# Patient Record
Sex: FEMALE
[De-identification: names, ages, dates, MRNs, and addresses within clinical notes are randomized per-mention and may not be internally consistent; named-entity substitution may affect disease eponyms.]

---

## 2019-01-18 ENCOUNTER — RESULT REVIEW (OUTPATIENT)
Age: 34
End: 2019-01-18

## 2019-11-27 ENCOUNTER — APPOINTMENT (OUTPATIENT)
Dept: OTOLARYNGOLOGY | Facility: CLINIC | Age: 34
End: 2019-11-27
Payer: COMMERCIAL

## 2019-11-27 ENCOUNTER — TRANSCRIPTION ENCOUNTER (OUTPATIENT)
Age: 34
End: 2019-11-27

## 2019-11-27 VITALS
HEART RATE: 56 BPM | SYSTOLIC BLOOD PRESSURE: 111 MMHG | BODY MASS INDEX: 22.22 KG/M2 | HEIGHT: 69 IN | WEIGHT: 150 LBS | DIASTOLIC BLOOD PRESSURE: 60 MMHG

## 2019-11-27 DIAGNOSIS — Z78.9 OTHER SPECIFIED HEALTH STATUS: ICD-10-CM

## 2019-11-27 DIAGNOSIS — Z85.828 PERSONAL HISTORY OF OTHER MALIGNANT NEOPLASM OF SKIN: ICD-10-CM

## 2019-11-27 DIAGNOSIS — Z80.3 FAMILY HISTORY OF MALIGNANT NEOPLASM OF BREAST: ICD-10-CM

## 2019-11-27 DIAGNOSIS — Z87.09 PERSONAL HISTORY OF OTHER DISEASES OF THE RESPIRATORY SYSTEM: ICD-10-CM

## 2019-11-27 PROBLEM — Z00.00 ENCOUNTER FOR PREVENTIVE HEALTH EXAMINATION: Status: ACTIVE | Noted: 2019-11-27

## 2019-11-27 PROCEDURE — 31231 NASAL ENDOSCOPY DX: CPT

## 2019-11-27 PROCEDURE — 99203 OFFICE O/P NEW LOW 30 MIN: CPT | Mod: 25

## 2019-11-27 NOTE — ASSESSMENT
[FreeTextEntry1] : She has a 10 month history of sinus and throat symptoms. A culture was sent to persistent bacterial sinus infection. She has mild allergies. She also suffers from reflux.\par \par PLAN\par \par -findings and management options discussed in detail with the patient. \par -Nasal saline rinses, nasal steroid spray (such as fluticasone), antihistamine/decongestant as needed\par -they were asked to call for the results of the culture in the next few days. I will place her on antibiotics if the culture is positive\par -Allergy precautions\par -CT scan of the sinuses\par -Reflux precautions and trial of Pepcid\par -follow up after the CT scan\par -call and return earlier if any concerns. \par

## 2019-11-27 NOTE — CONSULT LETTER
[Dear  ___] : Dear  [unfilled], [Consult Letter:] : I had the pleasure of evaluating your patient, [unfilled]. [Please see my note below.] : Please see my note below. [Consult Closing:] : Thank you very much for allowing me to participate in the care of this patient.  If you have any questions, please do not hesitate to contact me. [Sincerely,] : Sincerely, [FreeTextEntry3] : Natacha Leon MD\par

## 2019-11-27 NOTE — HISTORY OF PRESENT ILLNESS
[de-identified] : ASHLEY DICK is a 34 year patient with a 10 month history of sinus and throat symptoms. She initially had a URI.  she had persistent symptoms. She had allergy testing in the spring which was positive for grass allergy.  She was treated with flonase, zyrtec and augmentin (over the summer).  She had brief improvement in her symptoms. She has postnasal drip, throat clearing, globus sensation, heartburn, nasal congestion and mild intermittent obstruction. She does not have facial pain.  She has reflux. She takes TUMS as needed. She does notice that certain foods make her symptoms worse.  She does not smoke or have pets.

## 2019-12-03 LAB — BACTERIA FLD CULT: NORMAL

## 2020-01-03 ENCOUNTER — FORM ENCOUNTER (OUTPATIENT)
Age: 35
End: 2020-01-03

## 2020-01-04 ENCOUNTER — OUTPATIENT (OUTPATIENT)
Dept: OUTPATIENT SERVICES | Facility: HOSPITAL | Age: 35
LOS: 1 days | End: 2020-01-04

## 2020-01-04 ENCOUNTER — APPOINTMENT (OUTPATIENT)
Dept: CT IMAGING | Facility: CLINIC | Age: 35
End: 2020-01-04
Payer: COMMERCIAL

## 2020-01-04 PROCEDURE — 70486 CT MAXILLOFACIAL W/O DYE: CPT | Mod: 26

## 2020-01-22 ENCOUNTER — APPOINTMENT (OUTPATIENT)
Dept: OTOLARYNGOLOGY | Facility: CLINIC | Age: 35
End: 2020-01-22
Payer: COMMERCIAL

## 2020-01-22 VITALS — SYSTOLIC BLOOD PRESSURE: 119 MMHG | DIASTOLIC BLOOD PRESSURE: 66 MMHG | HEART RATE: 55 BPM

## 2020-01-22 DIAGNOSIS — J32.8 OTHER CHRONIC SINUSITIS: ICD-10-CM

## 2020-01-22 PROCEDURE — 99213 OFFICE O/P EST LOW 20 MIN: CPT

## 2020-01-22 NOTE — HISTORY OF PRESENT ILLNESS
[de-identified] : 11/27/19- ASHLEY DICK is a 34 year patient with a 10 month history of sinus and throat symptoms. She initially had a URI.  she had persistent symptoms. She had allergy testing in the spring which was positive for grass allergy.  She was treated with flonase, zyrtec and augmentin (over the summer).  She had brief improvement in her symptoms. She has postnasal drip, throat clearing, globus sensation, heartburn, nasal congestion and mild intermittent obstruction. She does not have facial pain.  She has reflux. She takes TUMS as needed. She does notice that certain foods make her symptoms worse.  She does not smoke or have pets. \par  [FreeTextEntry1] : \par 1/22/20- Camryn Garcia Is here for followup for chronic rhinitis, chronic sinusitis, and reflux. She has been doing well. She has had persistent nasal congestion and postnasal drip. She did notice that certain foods make it worse. She took Pepcid periodically. She had a CT scan of her sinuses. It showed a deviated septum and narrow but patent OMCs. Trace fluid was reported in the inferior right maxillary sinus although it did not look significant on the scan. She does not have sinus pain. She does not feel that she is getting many infections. Her sinus culture was negative.

## 2020-01-22 NOTE — CONSULT LETTER
[Dear  ___] : Dear  [unfilled], [Courtesy Letter:] : I had the pleasure of seeing your patient, [unfilled], in my office today. [Please see my note below.] : Please see my note below. [Consult Closing:] : Thank you very much for allowing me to participate in the care of this patient.  If you have any questions, please do not hesitate to contact me. [Sincerely,] : Sincerely, [FreeTextEntry3] : Natacha Leon MD\par

## 2020-01-22 NOTE — ASSESSMENT
[FreeTextEntry1] : She has chronic rhinitis with mild nasal congestion and postnasal drip. Her symptoms were likely due to allergies and reflux. We reviewed her CT scan.\par \par PLAN\par \par -findings and management options discussed in detail with the patient. \par -Nasal saline rinses, nasal steroid spray (such as fluticasone), antihistamine/decongestant as needed\par -trial of Atrovent\par -Allergy precautions\par -Reflux precautions\par -I recommended she try taking pepcid daily for 3 weeks.  \par -We discussed possible procedures. She could consider inferior turbinate submucous resection with possible clarifix procedure in the office. She could also consider septoplasty with inferior turbinate submucous resection and possible clarifix procedure in the operating room. If she is not getting recurrent infections, she can hold off on sinus procedures. She is going to think this over. I will see if she responds Atrovent and how much better she feels with better reflux control\par -follow up in 6 weeks. call and return earlier if any concerns. \par

## 2020-07-13 ENCOUNTER — TRANSCRIPTION ENCOUNTER (OUTPATIENT)
Age: 35
End: 2020-07-13

## 2020-07-24 ENCOUNTER — TRANSCRIPTION ENCOUNTER (OUTPATIENT)
Age: 35
End: 2020-07-24

## 2020-12-08 ENCOUNTER — APPOINTMENT (OUTPATIENT)
Dept: OTOLARYNGOLOGY | Facility: CLINIC | Age: 35
End: 2020-12-08
Payer: COMMERCIAL

## 2020-12-08 DIAGNOSIS — R42 DIZZINESS AND GIDDINESS: ICD-10-CM

## 2020-12-08 DIAGNOSIS — J31.0 CHRONIC RHINITIS: ICD-10-CM

## 2020-12-08 DIAGNOSIS — K21.9 GASTRO-ESOPHAGEAL REFLUX DISEASE W/OUT ESOPHAGITIS: ICD-10-CM

## 2020-12-08 PROCEDURE — 99213 OFFICE O/P EST LOW 20 MIN: CPT

## 2020-12-08 PROCEDURE — 92557 COMPREHENSIVE HEARING TEST: CPT

## 2020-12-08 PROCEDURE — 92567 TYMPANOMETRY: CPT

## 2020-12-08 PROCEDURE — 99072 ADDL SUPL MATRL&STAF TM PHE: CPT

## 2020-12-08 RX ORDER — FAMOTIDINE 10 MG/1
10 TABLET, FILM COATED ORAL
Qty: 60 | Refills: 1 | Status: DISCONTINUED | COMMUNITY
Start: 2019-11-27 | End: 2020-12-08

## 2020-12-08 RX ORDER — IPRATROPIUM BROMIDE 21 UG/1
0.03 SPRAY NASAL
Qty: 1 | Refills: 3 | Status: DISCONTINUED | COMMUNITY
Start: 2020-01-22 | End: 2020-12-08

## 2020-12-08 RX ORDER — FLUTICASONE PROPIONATE 50 MCG
50 SPRAY, SUSPENSION NASAL
Refills: 0 | Status: DISCONTINUED | COMMUNITY
End: 2020-12-08

## 2020-12-08 NOTE — ASSESSMENT
[FreeTextEntry1] : She has had intermittent dizziness/vertigo. Her symptoms suggest benign positional vertigo although we did discuss other possible etiologies. Her ear exam and audiogram were normal.  She has no other symptoms. [place] testing was negative although she may have had mild subjective dizziness to the right\par \par PLAN\par \par -findings and management options discussed in detail with the patient. \par -good aural hygiene\par -avoid using cotton swabs in the ears\par -noise precautions\par -annual audiogram\par -ABR, VNG, Ecochg to evaluate for peripheral vestibulopathy if the dizziness persists\par -she may also require neurology evauation, cardiology evaluation and MRI with/without contrast to rule out retrocochlear pathology if the dizziness does not resolve with vestibular therapy\par -vestibular therapy recommended\par -Allergy and sinus medications as needed. Reflux precautions and medication as needed\par -follow up after the vestibular therapy evaluation. If the dizziness does not resolve, we will send her for further workup\par -call and return earlier if any concerns. \par

## 2020-12-08 NOTE — HISTORY OF PRESENT ILLNESS
[de-identified] : ASHLEY DICK is a 35 year patient With history of chronic rhinitis, chronic sinusitis, allergies, and reflux. She has been doing well from the sinus standpoint. However, she has had a 4 month history of intermittent dizziness and vertigo. It started after she went swimming. She did not have any sudden onset of the symptoms. She started having a spinning sensation  whenever she would look up or down or to the side. It lasts several seconds. She also has mild dysequilibrium. Her symptoms have improved but not completely resolved. She has a little bit of tinnitus in the right ear. She has no otalgia, otorrhea, headaches/migraines, cervical disc disease, visual changes or other neurological symptoms. She has no history of recurrent ear infections, prior otologic surgery, ear/head trauma, noise exposure, or family history of hearing loss\par \par ENT history\par CT scan of the sinuses-deviated nasal septum, narrow but patent OMCs. Trace fluid reported in the inferior right maxillary sinus

## 2022-03-24 ENCOUNTER — TRANSCRIPTION ENCOUNTER (OUTPATIENT)
Age: 37
End: 2022-03-24

## 2022-04-02 ENCOUNTER — TRANSCRIPTION ENCOUNTER (OUTPATIENT)
Age: 37
End: 2022-04-02

## 2022-07-03 ENCOUNTER — RESULT REVIEW (OUTPATIENT)
Age: 37
End: 2022-07-03